# Patient Record
Sex: FEMALE
[De-identification: names, ages, dates, MRNs, and addresses within clinical notes are randomized per-mention and may not be internally consistent; named-entity substitution may affect disease eponyms.]

---

## 2020-01-19 ENCOUNTER — HOSPITAL ENCOUNTER (EMERGENCY)
Dept: HOSPITAL 5 - ED | Age: 25
Discharge: HOME | End: 2020-01-19
Payer: SELF-PAY

## 2020-01-19 VITALS — SYSTOLIC BLOOD PRESSURE: 120 MMHG | DIASTOLIC BLOOD PRESSURE: 87 MMHG

## 2020-01-19 DIAGNOSIS — N12: Primary | ICD-10-CM

## 2020-01-19 LAB
BACTERIA #/AREA URNS HPF: (no result) /HPF
BILIRUB UR QL STRIP: (no result)
BLOOD UR QL VISUAL: (no result)
MUCOUS THREADS #/AREA URNS HPF: (no result) /HPF
PH UR STRIP: 8 [PH] (ref 5–7)
PROT UR STRIP-MCNC: (no result) MG/DL
RBC #/AREA URNS HPF: 4 /HPF (ref 0–6)
UROBILINOGEN UR-MCNC: < 2 MG/DL (ref ?–2)
WBC #/AREA URNS HPF: 7 /HPF (ref 0–6)

## 2020-01-19 PROCEDURE — 96372 THER/PROPH/DIAG INJ SC/IM: CPT

## 2020-01-19 PROCEDURE — 81025 URINE PREGNANCY TEST: CPT

## 2020-01-19 PROCEDURE — 99283 EMERGENCY DEPT VISIT LOW MDM: CPT

## 2020-01-19 PROCEDURE — 81001 URINALYSIS AUTO W/SCOPE: CPT

## 2020-01-19 NOTE — EMERGENCY DEPARTMENT REPORT
ED General Adult HPI





- General


Chief complaint: Urogenital-Female


Stated complaint: BACK/ABD PX WHEN URINATING


Time Seen by Provider: 01/19/20 16:12


Source: patient


Mode of arrival: Ambulatory


Limitations: No Limitations





- History of Present Illness


Initial comments: 





24-year-old  female presents with complaints of burning urination, 

urinary frequency, and lower pelvic pain 2 days.  Patient states pain now 

radiating to left flank area.  She denies any nausea/vomiting, vaginal 

discharge, dyspareunia, vaginal bleeding, or concern for STDs.  She also denies 

any fever/chills/sweats, history of pyelonephritis or kidney stones, or stool 

changes.  She rates her pain as a 10/10 in severity.


-: Sudden





- Related Data


                                  Previous Rx's











 Medication  Instructions  Recorded  Last Taken  Type


 


Acetaminophen/Codeine [Tylenol 1 tab PO Q6H PRN #8 tab 01/19/20 Unknown Rx





/Codeine # 3 tab]    


 


Ciprofloxacin HCl [Ciprofloxacin 500 mg PO Q12HR 7 Days #14 tab 01/19/20 Unknown

 Rx





TAB]    











                                    Allergies











Allergy/AdvReac Type Severity Reaction Status Date / Time


 


No Known Allergies Allergy   Unverified 01/19/20 16:02














ED Review of Systems


ROS: 


Stated complaint: BACK/ABD PX WHEN URINATING


Other details as noted in HPI





Comment: All other systems reviewed and negative


Constitutional: denies: chills, fever


Respiratory: denies: shortness of breath


Gastrointestinal: as per HPI


Genitourinary: as per HPI





ED Past Medical Hx





- Past Medical History


Previous Medical History?: No





- Social History


Smoking Status: Never Smoker


Substance Use Type: None





- Medications


Home Medications: 


                                Home Medications











 Medication  Instructions  Recorded  Confirmed  Last Taken  Type


 


Acetaminophen/Codeine [Tylenol 1 tab PO Q6H PRN #8 tab 01/19/20  Unknown Rx





/Codeine # 3 tab]     


 


Ciprofloxacin HCl [Ciprofloxacin 500 mg PO Q12HR 7 Days #14 tab 01/19/20  

Unknown Rx





TAB]     














ED Physical Exam





- General


Limitations: No Limitations


General appearance: alert, in no apparent distress





- Head


Head exam: Present: atraumatic, normocephalic





- Eye


Eye exam: Present: normal appearance.  Absent: scleral icterus





- ENT


ENT exam: Present: mucous membranes moist





- Neck


Neck exam: Present: normal inspection





- Respiratory


Respiratory exam: Present: normal lung sounds bilaterally.  Absent: respiratory 

distress





- Cardiovascular


Cardiovascular Exam: Present: regular rate, normal rhythm.  Absent: systolic 

murmur, diastolic murmur, rubs, gallop





- GI/Abdominal


GI/Abdominal exam: Present: soft, tenderness (suprapubic, left lower quadrant). 

Absent: distended, guarding, rebound, rigid





- Back Exam


Back exam: Present: full ROM, CVA tenderness (L).  Absent: CVA tenderness (R)





- Neurological Exam


Neurological exam: Present: alert, oriented X3





- Psychiatric


Psychiatric exam: Present: normal affect, normal mood





- Skin


Skin exam: Present: warm, dry, intact, normal color.  Absent: rash





ED Course





                                   Vital Signs











  01/19/20





  16:13


 


Temperature 98.6 F


 


Pulse Rate 89


 


Respiratory 18





Rate 


 


Blood Pressure 120/87


 


O2 Sat by Pulse 100





Oximetry 














ED Medical Decision Making





- Lab Data








                                   Lab Results











  01/19/20 Range/Units





  16:20 


 


Urine Color  Straw  (Yellow)  


 


Urine Turbidity  Clear  (Clear)  


 


Urine pH  8.0 H  (5.0-7.0)  


 


Ur Specific Gravity  1.006  (1.003-1.030)  


 


Urine Protein  <15 mg/dl  (Negative)  mg/dL


 


Urine Glucose (UA)  Neg  (Negative)  mg/dL


 


Urine Ketones  Neg  (Negative)  mg/dL


 


Urine Blood  Mod  (Negative)  


 


Urine Nitrite  Neg  (Negative)  


 


Ur Reducing Substances  Not Reportable  


 


Urine Bilirubin  Neg  (Negative)  


 


Urine Ictotest  Not Reportable  


 


Urine Urobilinogen  < 2.0  (<2.0)  mg/dL


 


Ur Leukocyte Esterase  Sm  (Negative)  


 


Urine WBC (Auto)  7.0 H  (0.0-6.0)  /HPF


 


Urine RBC (Auto)  4.0  (0.0-6.0)  /HPF


 


U Epithel Cells (Auto)  4.0  (0-13.0)  /HPF


 


Urine Bacteria (Auto)  1+  (Negative)  /HPF


 


Urine Mucus  Few  /HPF


 


Urine HCG, Qual  Negative  (Negative)  














- Medical Decision Making





24-year-old  female here with complaints of dysuria and left flank pain

for the past 2 days.  She is afebrile and nontoxic tachycardic.  UA shows 7 WBCs

and moderate RBCs.  Recommended to patient that she have a CT abdomen and pelvis

with labs performed to rule out kidney stone or significant pyelonephritis, 

patient declines and states she just wants antibiotic treatment for her 

infection.  Discussed possibility of symptoms worsening or missing a significant

abnormality of the tests are not performed, patient states understanding and 

continues to decline further testing.  Patient is nontoxic appearing. Will 

discharge home with treatment for pyelonephritis.  Rocephin 1 g IM given, 

Ciprofloxacin for home. Recommend follow-up with primary care provider within 2 

days.  Discussed very strict return precautions in great detail with patient who

verbalizes understanding.


Critical care attestation.: 


If time is entered above; I have spent that time in minutes in the direct care 

of this critically ill patient, excluding procedure time.








ED Disposition


Clinical Impression: 


 Pyelonephritis of left kidney





Disposition: DC-01 TO HOME OR SELFCARE


Is pt being admited?: No


Condition: Stable


Instructions:  Acute Pyelonephritis (ED)


Prescriptions: 


Ciprofloxacin HCl [Ciprofloxacin TAB] 500 mg PO Q12HR 7 Days #14 tab


Acetaminophen/Codeine [Tylenol /Codeine # 3 tab] 1 tab PO Q6H PRN #8 tab


 PRN Reason: Pain , Severe (7-10)


Referrals: 


ISABEL KAHN MD [Staff Physician] - 01/21/20

## 2020-01-19 NOTE — EMERGENCY DEPARTMENT REPORT
Blank Doc





- Documentation


Documentation: 





24-year-old female that presents with dysuria and pelvic pain.





This initial assessment/diagnostic orders/clinical plan/treatment(s) is/are 

subject to change based on patient's health status, clinical progression and re-

assessment by fellow clinical providers in the ED.  Further treatment and workup

at subsequent clinical providers discretion.  Patient/guardians urged not to 

elope from the ED as their condition may be serious if not clinically assessed 

and managed.  Initial orders include:


1- Patient sent to [facility] for further evaluation and treatment


2- UA